# Patient Record
Sex: FEMALE | URBAN - METROPOLITAN AREA
[De-identification: names, ages, dates, MRNs, and addresses within clinical notes are randomized per-mention and may not be internally consistent; named-entity substitution may affect disease eponyms.]

---

## 2017-12-11 ENCOUNTER — OFFICE VISIT (OUTPATIENT)
Dept: PEDIATRIC ENDOCRINOLOGY | Facility: MEDICAL CENTER | Age: 22
End: 2017-12-11
Payer: COMMERCIAL

## 2017-12-11 ENCOUNTER — TELEPHONE (OUTPATIENT)
Dept: PEDIATRIC ENDOCRINOLOGY | Facility: MEDICAL CENTER | Age: 22
End: 2017-12-11

## 2017-12-11 VITALS
SYSTOLIC BLOOD PRESSURE: 110 MMHG | DIASTOLIC BLOOD PRESSURE: 80 MMHG | BODY MASS INDEX: 18.93 KG/M2 | WEIGHT: 90.2 LBS | HEART RATE: 72 BPM | HEIGHT: 58 IN

## 2017-12-11 DIAGNOSIS — E28.39 ACQUIRED PREMATURE OVARIAN FAILURE: ICD-10-CM

## 2017-12-11 DIAGNOSIS — L65.9 HAIR LOSS: ICD-10-CM

## 2017-12-11 DIAGNOSIS — R73.09 ELEVATED HEMOGLOBIN A1C: ICD-10-CM

## 2017-12-11 LAB
HBA1C MFR BLD: 5.4 % (ref ?–5.8)
INT CON NEG: NEGATIVE
INT CON POS: POSITIVE

## 2017-12-11 PROCEDURE — 99214 OFFICE O/P EST MOD 30 MIN: CPT | Performed by: NURSE PRACTITIONER

## 2017-12-11 PROCEDURE — 83036 HEMOGLOBIN GLYCOSYLATED A1C: CPT | Performed by: NURSE PRACTITIONER

## 2017-12-11 RX ORDER — LANCETS 33 GAUGE
200 EACH MISCELLANEOUS 3 TIMES DAILY
Qty: 100 EACH | Refills: 11 | Status: SHIPPED | OUTPATIENT
Start: 2017-12-11

## 2017-12-11 NOTE — PROGRESS NOTES
Subjective:     HPI:     Carole Waddell is a 22 y.o. 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    Carole Has not been seen in the past 2 and half years. She was last seen by Dr. Solano in June 2015. 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    In 2011, she was noted to have elevated blood sugars on routine labs. Repeat labs done in 2012 showed an A1c = 6.1% with negative insulin antibodies, JOVANY 65 and islet cell antibodies. Her A1c's remained at approximately 5.8% to 5.9% throughout 5435-2233.    She also has a history of elevated FSH and LH. She did have regular menses until April 2014. Subsequently, her periods stopped and she was started on oral contraceptives.    She reports that she discontinued her birth control pills approximately 2 months ago due to hair loss.  She states she had menses approximately one month ago which was described as spotting and lasting one week. Of note, her and her mother are not very good historians. Mom states prior to this she was on OCPs good compliance and regular menses.  Ovarian autoantibodies have been ordered in the past but not obtained.    Mom is concerned because she feels she has lost weight. She is lost 0.3 kg in the last 2 and half years. For breakfast she will have cereal, lunch with Isabel with milk, and dinner yesterday was Rice soup with chicken and veggies. She does not tend to snack in between meals. Her exercise consists of walking her dog. As you can see from her results below, her A1c has trended down to 5.4%. TANIA testing has been ordered in the past but not obtained.    She denies constipation, diarrhea, dry her skin, headaches. She is currently living at home with her parents and attending [employer] college.    Results for CAROLE WADDELL (MRN [id number])     "12/11/2017 08:17   Glycohemoglobin 5.4       ROS   No fatigue, loss of appetite.  No headaches.  No abdominal pain, nausea, vomiting, constipation or diarrhea.   + dry skin and hair loss.  No nocturia, polyuria, polydipsia  No sleep disturbance    No Known Allergies    Current medicines (including changes today)  Current Outpatient Prescriptions   Medication Sig Dispense Refill   • ONETOUCH DELICA LANCETS 33G Misc 200 Devices by Does not apply route 3 times a day. 100 Each 11   • glucose blood (ACCU-CHEK SMARTVIEW) strip Test blood sugars up to 3 x per day 100 Strip 11     No current facility-administered medications for this visit.        Patient Active Problem List    Diagnosis Date Noted   • Acquired premature ovarian failure 12/11/2017   • Elevated hemoglobin A1c 12/11/2017   • Hair loss 12/11/2017       Past Medical History: Short stature status post treatment with growth hormone (4199-0693). SGA (birth weight 2 lbs. 1 oz. at 32 weeks). Probable autoimmune ovarian failure with rising FSH and LH in 2014, OCPs started August 2014. History of elevated hemoglobin A1c's, rule out TANIA    Family History: Pertinent negatives: No clotting disorders. Pertinent positives: Thyroid cancer, lymphoma, esophageal cancer. Father: Hyperparathyroidism.    Social History:    Surgical History: None     Blood pressure 110/80, pulse 72, height 1.477 m (4' 10.16\"), weight 40.9 kg (90 lb 3.2 oz).    Physical Exam:  Constitutional: Thin, short stature No distress.   Skin: Skin is warm and dry. No rash noted.  Head: Atraumatic without lesions. Diffuse hair loss.  Mouth/Throat: Tongue normal. Oropharynx is clear and moist. Posterior pharynx without erythema or exudates.  Neck: Supple, trachea midline. No thyromegaly present.   Cardiovascular: Regular rate and rhythm.   Chest: Effort normal. Clear to auscultation throughout. No adventitious sounds.   Abdomen: Soft, non tender, and without distention.   Extremities: No cyanosis, clubbing, " erythema, nor edema.   Neurological: Alert and oriented x 3.Sensation intact.   Psychiatric:  Behavior, mood, and affect are appropriate.      Assessment and Plan:   The following treatment plan was discussed:     1. Acquired premature ovarian failure  In the distant past, she had elevated FSH and LH implying premature ovarian failure. Ovarian autoantibodies have been ordered but not obtained. I've given mom a lab slip at today's visit and asked her to obtain labs. Additionally, since she has been off OCPs, will obtain FSH and LH. It is curious that she had reported menses after stopping OCPs. The family is aware that if this is true autoimmune ovarian failure that she will be infertile. In the distant past, she was referred to reproductive endocrinology but did not follow-up. Given her history of premature ovarian failure, I would like to obtain a baseline DEXA scan to assess her bone density as well. The mother is aware that if this is still consistent with premature ovarian failure that she will require OCPs to prevent the long-term complications such as art disease, bone loss and osteoporosis, etc.  - CBC w Differential; Future  - Comprehensive Metabolic Panel; Future  - Lipid Profile; Future  - Vitamin D, 25 Hydroxy; Future  - Luteinizing Hormone; Future  - FSH; Future  - T4 Free; Future  - TSH; Future  - IGA Quant; Future  - T-Transglutaminase IGA; Future  - MISCELLANEOUS TEST; Future  - DS-BONE DENSITY STUDY (DEXA); Future    2. Elevated hemoglobin A1c  At today's visit, her A1c has trended down. However, she has a prolonged history of elevated A1c's. I will attempt to get a prior authorization to test for TANIA.    - POCT Hemoglobin A1C  - ONETOUCH DELICA LANCETS 33G Misc; 200 Devices by Does not apply route 3 times a day.  Dispense: 100 Each; Refill: 11  - glucose blood (ACCU-CHEK SMARTVIEW) strip; Test blood sugars up to 3 x per day  Dispense: 100 Strip; Refill: 11    3. Hair loss  She does have diffuse  hair loss. We'll screen for other autoimmune diseases that can cause hair loss such as celiac and thyroid disease. Additionally, since she is on vitamin D replacement, will obtain a vitamin D level as well.    -Any change or worsening of signs or symptoms, patient encouraged to follow-up or report to emergency room for further evaluation. Patient verbalizes understanding and agrees.    Followup: Return in about 3 months (around 3/11/2018).

## 2017-12-11 NOTE — TELEPHONE ENCOUNTER
Could you please get a prior authorization for TANIA (mature onset diabetes of the young) testing? It is already ordered under miscellaneous.

## 2017-12-22 LAB
25(OH)D3+25(OH)D2 SERPL-MCNC: 29.1 NG/ML (ref 30–100)
ALBUMIN SERPL-MCNC: 5 G/DL (ref 3.5–5.5)
ALBUMIN/GLOB SERPL: 1.9 {RATIO} (ref 1.2–2.2)
ALP SERPL-CCNC: 86 IU/L (ref 39–117)
ALT SERPL-CCNC: 29 IU/L (ref 0–32)
AST SERPL-CCNC: 35 IU/L (ref 0–40)
BASOPHILS # BLD AUTO: 0 X10E3/UL (ref 0–0.2)
BASOPHILS NFR BLD AUTO: 0 %
BILIRUB SERPL-MCNC: 0.4 MG/DL (ref 0–1.2)
BUN SERPL-MCNC: 11 MG/DL (ref 6–20)
BUN/CREAT SERPL: 13 (ref 9–23)
CALCIUM SERPL-MCNC: 9.7 MG/DL (ref 8.7–10.2)
CHLORIDE SERPL-SCNC: 100 MMOL/L (ref 96–106)
CHOLEST SERPL-MCNC: 185 MG/DL (ref 100–199)
CO2 SERPL-SCNC: 23 MMOL/L (ref 18–29)
COMMENT 011824: NORMAL
CREAT SERPL-MCNC: 0.83 MG/DL (ref 0.57–1)
EOSINOPHIL # BLD AUTO: 0.3 X10E3/UL (ref 0–0.4)
EOSINOPHIL NFR BLD AUTO: 7 %
ERYTHROCYTE [DISTWIDTH] IN BLOOD BY AUTOMATED COUNT: 14.9 % (ref 12.3–15.4)
FSH SERPL-ACNC: 32.1 MIU/ML
GLOBULIN SER CALC-MCNC: 2.7 G/DL (ref 1.5–4.5)
GLUCOSE SERPL-MCNC: 82 MG/DL (ref 65–99)
HCT VFR BLD AUTO: 45.4 % (ref 34–46.6)
HDLC SERPL-MCNC: 75 MG/DL
HGB BLD-MCNC: 14.5 G/DL (ref 11.1–15.9)
IF AFRICAN AMERICAN  100797: 116 ML/MIN/1.73
IF NON AFRICAN AMER 100791: 100 ML/MIN/1.73
IGA SERPL-MCNC: 170 MG/DL (ref 87–352)
IMM GRANULOCYTES # BLD: 0 X10E3/UL (ref 0–0.1)
IMM GRANULOCYTES NFR BLD: 0 %
IMMATURE CELLS  115398: NORMAL
LDLC SERPL CALC-MCNC: 99 MG/DL (ref 0–99)
LH SERPL-ACNC: 13.1 MIU/ML
LYMPHOCYTES # BLD AUTO: 1.6 X10E3/UL (ref 0.7–3.1)
LYMPHOCYTES NFR BLD AUTO: 35 %
Lab: NORMAL TITER
MCH RBC QN AUTO: 28.5 PG (ref 26.6–33)
MCHC RBC AUTO-ENTMCNC: 31.9 G/DL (ref 31.5–35.7)
MCV RBC AUTO: 89 FL (ref 79–97)
MONOCYTES # BLD AUTO: 0.3 X10E3/UL (ref 0.1–0.9)
MONOCYTES NFR BLD AUTO: 7 %
MORPHOLOGY BLD-IMP: NORMAL
NEUTROPHILS # BLD AUTO: 2.4 X10E3/UL (ref 1.4–7)
NEUTROPHILS NFR BLD AUTO: 51 %
NRBC BLD AUTO-RTO: NORMAL %
PLATELET # BLD AUTO: 209 X10E3/UL (ref 150–379)
POTASSIUM SERPL-SCNC: 4.2 MMOL/L (ref 3.5–5.2)
PROT SERPL-MCNC: 7.7 G/DL (ref 6–8.5)
RBC # BLD AUTO: 5.09 X10E6/UL (ref 3.77–5.28)
SODIUM SERPL-SCNC: 140 MMOL/L (ref 134–144)
T4 FREE SERPL-MCNC: 1.65 NG/DL (ref 0.82–1.77)
TRIGL SERPL-MCNC: 55 MG/DL (ref 0–149)
TSH SERPL DL<=0.005 MIU/L-ACNC: 3.27 UIU/ML (ref 0.45–4.5)
TTG IGA SER-ACNC: <2 U/ML (ref 0–3)
VLDLC SERPL CALC-MCNC: 11 MG/DL (ref 5–40)
WBC # BLD AUTO: 4.6 X10E3/UL (ref 3.4–10.8)

## 2018-01-17 ENCOUNTER — TELEPHONE (OUTPATIENT)
Dept: PEDIATRIC ENDOCRINOLOGY | Facility: MEDICAL CENTER | Age: 23
End: 2018-01-17

## 2018-01-17 NOTE — TELEPHONE ENCOUNTER
----- Message from Beatrice Cardenas Med Ass't sent at 1/17/2018  8:42 AM PST -----      ----- Message -----  From: JENNIFER Carlin  Sent: 1/16/2018   1:12 PM  To: Beatrice Cardenas, Med Ass't    Please call parents with results. Her bone density is low. I would like to discuss with the patient starting medication (Lo Loestrin) to improve her bone density. Could you please call her and have her make an appointment.

## 2018-03-12 ENCOUNTER — APPOINTMENT (OUTPATIENT)
Dept: PEDIATRIC ENDOCRINOLOGY | Facility: MEDICAL CENTER | Age: 23
End: 2018-03-12
Payer: COMMERCIAL

## 2018-10-25 ENCOUNTER — OFFICE VISIT (OUTPATIENT)
Dept: PEDIATRIC ENDOCRINOLOGY | Facility: MEDICAL CENTER | Age: 23
End: 2018-10-25
Payer: COMMERCIAL

## 2018-10-25 VITALS
DIASTOLIC BLOOD PRESSURE: 58 MMHG | HEART RATE: 80 BPM | WEIGHT: 87.2 LBS | BODY MASS INDEX: 17.58 KG/M2 | HEIGHT: 59 IN | SYSTOLIC BLOOD PRESSURE: 92 MMHG

## 2018-10-25 DIAGNOSIS — M85.89 OSTEOPENIA OF MULTIPLE SITES: ICD-10-CM

## 2018-10-25 DIAGNOSIS — E28.39 OVARIAN FAILURE: ICD-10-CM

## 2018-10-25 DIAGNOSIS — R73.09 ELEVATED HEMOGLOBIN A1C: ICD-10-CM

## 2018-10-25 LAB
HBA1C MFR BLD: 5.8 % (ref ?–5.8)
INT CON NEG: NEGATIVE
INT CON POS: POSITIVE

## 2018-10-25 PROCEDURE — 99214 OFFICE O/P EST MOD 30 MIN: CPT | Performed by: NURSE PRACTITIONER

## 2018-10-25 PROCEDURE — 83036 HEMOGLOBIN GLYCOSYLATED A1C: CPT | Performed by: NURSE PRACTITIONER

## 2018-10-25 RX ORDER — NORETHINDRONE ACETATE AND ETHINYL ESTRADIOL 1MG-20(21)
1 KIT ORAL DAILY
Qty: 28 TAB | Refills: 5 | Status: SHIPPED | OUTPATIENT
Start: 2018-10-25 | End: 2019-01-18 | Stop reason: SDUPTHER

## 2018-10-25 RX ORDER — BLOOD-GLUCOSE METER
KIT MISCELLANEOUS
Qty: 1 DEVICE | Refills: 11 | Status: SHIPPED | OUTPATIENT
Start: 2018-10-25 | End: 2019-01-18 | Stop reason: SDUPTHER

## 2018-10-25 NOTE — PROGRESS NOTES
Subjective:     HPI:     Carole Waddell is a 23 y.o. 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     Carole had not been seen until just recently.  She was last seen by Dr. Solano in June 2015. 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     In 2011, she was noted to have elevated blood sugars on routine labs. Repeat labs done in 2012 showed an A1c = 6.1% with negative insulin antibodies, JOVANY 65 and islet cell antibodies. Her A1c's remained at approximately 5.8% to 5.9% throughout 7340-7609.  I did attempt to get prior authorization for Daryl testing at her last visit.  However, I did not hear back from the insurance company.  I did have the medical assistant at today's visit we submit a prior authorization.     She also has a history of elevated FSH and LH. She did have regular menses until April 2014. Subsequently, her periods stopped and she was started on oral contraceptives.  She discontinued her oral contraceptives in approximately October 2017 due to hair loss.  Mom states that she then started her on a women's multivitamin which resulted in regular periods for the next 4-6 months.  However, she then began skipping her cycle.  At this point (last month), mom resumed her OCPs and she resumed having cycles.  I did obtain a DEXA scan that was done at [facility] due to her history of ovarian failure that shows osteopenia of her AP spine, dual femurs right and left.  Repeat lab testing (see below) shows continued elevation of FSH and LH.  Interestingly, her anti-ovarian antibody testing was negative.      Mom is concerned because she feels she has lost weight.  At the last visit she had only loss 0.3 kg in the last 2 and half years.  Mom remains concerned about her weight.     She is currently  living at home and attending [employer].  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    Mom reports that in addition to a women's multivitamin, she is also on vitamin D, 2000 units daily.  They report good compliance.     12/19/2017 07:46 10/25/2018 10:41   WBC 4.6    RBC 5.09    Hemoglobin 14.5    Hematocrit 45.4    MCV 89    MCH 28.5    MCHC 31.9    RDW 14.9    Platelet Count 209    Immature Cells CANCELED    Neutrophils-Polys 51    Neutrophils (Absolute) 2.4    Lymphocytes 35    Lymphs (Absolute) 1.6    Monocytes 7    Monos (Absolute) 0.3    Eosinophils 7    Eos (Absolute) 0.3    Basophils 0    Baso (Absolute) 0.0    Immature Granulocytes 0    Immature Granulocytes (abs) 0.0    Nucleated RBC CANCELED    Comments-Diff CANCELED    Sodium 140    Potassium 4.2    Chloride 100    Co2 23    Glucose 82    Bun 11    Creatinine 0.83    GFR If  116    GFR If Non African American 100    Bun-Creatinine Ratio 13    Calcium 9.7    AST(SGOT) 35    ALT(SGPT) 29    Alkaline Phosphatase 86    Total Bilirubin 0.4    Albumin 5.0    Total Protein 7.7    Globulin 2.7    A-G Ratio 1.9    Glycohemoglobin  5.8   Cholesterol,Tot 185    Triglycerides 55    HDL 75    LDL 99    VLDL Cholesterol Calc 11    Comment: CANCELED    25-Hydroxy   Vitamin D 25 29.1 (L)    Anti-Ovary Antibody <1:10    Immunoglobulin A 170    t-TG IgA <2    TSH 3.270    Free T-4 1.65    Follicle Stimulating Hormone 32.1    Luteinizing Hormone 13.1        ROS   No fatigue, loss of appetite.  No headaches.  No abdominal pain, nausea, vomiting, constipation or diarrhea.   No dry skin, dry hair or hair loss.  No nocturia, polyuria, polydipsia  No sleep disturbance  No fracture    No Known Allergies    Current medicines (including changes today)  Current Outpatient Prescriptions   Medication Sig Dispense Refill   • Blood Glucose Monitoring Suppl (FREESTYLE LITE) Device Used to test blood sugars up to 3 times per day 1 Device 11   • glucose blood (FREESTYLE LITE)  "strip Test blood sugars up to 3 x per day 100 Strip 11   • norethindrone-ethinyl estradiol (LOESTRIN FE 1/20) 1-20 MG-MCG per tablet Take 1 Tab by mouth every day. 28 Tab 5   • ONETOUCH DELICA LANCETS 33G Misc 200 Devices by Does not apply route 3 times a day. 100 Each 11     No current facility-administered medications for this visit.        Patient Active Problem List    Diagnosis Date Noted   • Osteopenia of multiple sites 10/25/2018   • Acquired premature ovarian failure 12/11/2017   • Elevated hemoglobin A1c 12/11/2017   • Hair loss 12/11/2017       Past Medical History: Short stature status post treatment with growth hormone (0731-3150). SGA (birth weight 2 lbs. 1 oz. at 32 weeks). Probable autoimmune ovarian failure with rising FSH and LH in 2014, OCPs started August 2014. History of elevated hemoglobin A1c's, rule out TANIA     Family History: Pertinent negatives: No clotting disorders. Pertinent positives: Thyroid cancer, lymphoma, esophageal cancer. Father: Hyperparathyroidism.     Social History:     Surgical History: None      Objective:     Blood pressure (!) 92/58, pulse 80, height 1.487 m (4' 10.54\"), weight 39.6 kg (87 lb 3.2 oz).    Physical Exam:  Constitutional: Well-developed and well-nourished.  No distress.   Skin: Skin is warm and dry. No rash noted.  Head: Atraumatic without lesions.  Eyes:  No scleral icterus.   Mouth/Throat: Tongue normal. Oropharynx is clear and moist. Posterior pharynx without erythema or exudates.  Neck: Supple, trachea midline. No thyromegaly present.   Cardiovascular: Regular rate and rhythm.   Chest: Effort normal. Clear to auscultation throughout. No adventitious sounds.   Abdomen: Soft, non tender, and without distention. Active bowel sounds in all four quadrants. No rebound, guarding, masses or hepatosplenomegaly.  Extremities: No cyanosis, clubbing, erythema, nor edema.   Neurological: Alert and oriented x 3.Sensation intact.   Psychiatric:  Behavior, mood, and " affect are appropriate.      Assessment and Plan:   The following treatment plan was discussed:     1. Elevated hemoglobin A1c  She continues to have mildly elevated A1c's.  Mom is requesting a new glucometer and test strips.  One was sent to the pharmacy that is covered under their plan.  I have asked my medical assistant to resubmit authorization for MOD Y testing.  This was done at the time of the visit.  She was instructed to call the office if she develops polyuria, polydipsia or rising blood sugars.  - POCT Hemoglobin A1C    2. Ovarian failure  The etiology of her ovarian failure is not clear.  She has negative ovarian antibodies.  In the past this was thought to be autoimmune in nature.  Given her advancing age, I will refer to gynecology.  I am pleased that she is restart her birth control pills, these will provide needed estrogen to prevent further bone loss.  Mom is requesting a refill.  She states she would like to start the Loestrin that Dr. Solano started on in the past.  She felt she did best on this OCP.  Today we did discuss the possibility of impaired fertility due to her ovarian failure.  I have recommended that she talk with a reproductive endocrinology about options for egg preservation.  It was explained to the patient and her mother that it will likely be difficult for her to become pregnant in the future.  Therefore, I would like a reproductive endocrinologist to determine if he thinks a retrieval is an option.  I have told him not to wait as time could be of the essence.  - US-PELVIC TRANSABDOMINAL ONLY; Future  - REFERRAL TO GYNECOLOGY  - REFERRAL TO ENDOCRINOLOGY  - Blood Glucose Monitoring Suppl (FREESTYLE LITE) Device; Used to test blood sugars up to 3 times per day  Dispense: 1 Device; Refill: 11  - glucose blood (FREESTYLE LITE) strip; Test blood sugars up to 3 x per day  Dispense: 100 Strip; Refill: 11  - norethindrone-ethinyl estradiol (LOESTRIN FE 1/20) 1-20 MG-MCG per tablet; Take 1  Tab by mouth every day.  Dispense: 28 Tab; Refill: 5    3. Osteopenia of multiple sites  DEXA scan revealed osteopenia consistent with her ovarian failure.  I am pleased she is back on birth control pills.  No history of fractures.  She is currently on vitamin D and a multivitamin with calcium.  We also discussed the importance of weightbearing exercise to maximize bone potential.  - REFERRAL TO ENDOCRINOLOGY  - Blood Glucose Monitoring Suppl (FREESTYLE LITE) Device; Used to test blood sugars up to 3 times per day  Dispense: 1 Device; Refill: 11  - glucose blood (FREESTYLE LITE) strip; Test blood sugars up to 3 x per day  Dispense: 100 Strip; Refill: 11  - norethindrone-ethinyl estradiol (LOESTRIN FE 1/20) 1-20 MG-MCG per tablet; Take 1 Tab by mouth every day.  Dispense: 28 Tab; Refill: 5    -Any change or worsening of signs or symptoms, patient encouraged to follow-up or report to emergency room for further evaluation. Patient verbalizes understanding and agrees.    Followup: Return for referred to gyn.

## 2018-10-30 ENCOUNTER — TELEPHONE (OUTPATIENT)
Dept: PEDIATRIC ENDOCRINOLOGY | Facility: MEDICAL CENTER | Age: 23
End: 2018-10-30

## 2018-10-30 DIAGNOSIS — R73.09 ELEVATED HEMOGLOBIN A1C: ICD-10-CM

## 2018-10-30 DIAGNOSIS — R73.9 HYPERGLYCEMIA: ICD-10-CM

## 2018-10-30 RX ORDER — BLOOD-GLUCOSE METER
1 EACH MISCELLANEOUS
Qty: 1 KIT | Refills: 11 | Status: SHIPPED | OUTPATIENT
Start: 2018-10-30 | End: 2019-11-19

## 2018-10-30 NOTE — TELEPHONE ENCOUNTER
Mom called requesting to have accu-check meter ans trips sent to [facility] mom states her insurance will cover .

## 2019-01-17 ENCOUNTER — TELEPHONE (OUTPATIENT)
Dept: PEDIATRIC ENDOCRINOLOGY | Facility: MEDICAL CENTER | Age: 24
End: 2019-01-17

## 2019-01-17 DIAGNOSIS — E28.39 OVARIAN FAILURE: ICD-10-CM

## 2019-01-17 DIAGNOSIS — R73.9 HYPERGLYCEMIA: ICD-10-CM

## 2019-01-17 DIAGNOSIS — R73.09 ELEVATED HEMOGLOBIN A1C: ICD-10-CM

## 2019-01-17 DIAGNOSIS — M85.89 OSTEOPENIA OF MULTIPLE SITES: ICD-10-CM

## 2019-01-17 NOTE — TELEPHONE ENCOUNTER
Mother came in asking to change pharmacy to [facility] at [address], [place] [address]. Phone number [phone].   Please send rx for Kwan Fe birth control and freestyle monitor and test strip since ins does not cover accu-chek starting January.    See media for med change notice.

## 2019-01-18 RX ORDER — NORETHINDRONE ACETATE AND ETHINYL ESTRADIOL 1MG-20(21)
1 KIT ORAL DAILY
Qty: 28 TAB | Refills: 5 | Status: SHIPPED | OUTPATIENT
Start: 2019-01-18 | End: 2019-01-21 | Stop reason: SDUPTHER

## 2019-01-18 RX ORDER — BLOOD-GLUCOSE METER
KIT MISCELLANEOUS
Qty: 1 DEVICE | Refills: 11 | Status: SHIPPED | OUTPATIENT
Start: 2019-01-18 | End: 2019-01-21 | Stop reason: SDUPTHER

## 2019-01-21 ENCOUNTER — TELEPHONE (OUTPATIENT)
Dept: PEDIATRIC ENDOCRINOLOGY | Facility: MEDICAL CENTER | Age: 24
End: 2019-01-21

## 2019-01-21 RX ORDER — NORETHINDRONE ACETATE AND ETHINYL ESTRADIOL 1MG-20(21)
1 KIT ORAL DAILY
Qty: 28 TAB | Refills: 5 | Status: SHIPPED | OUTPATIENT
Start: 2019-01-21 | End: 2019-11-19 | Stop reason: SDUPTHER

## 2019-01-21 RX ORDER — BLOOD-GLUCOSE METER
KIT MISCELLANEOUS
Qty: 1 DEVICE | Refills: 11 | Status: SHIPPED | OUTPATIENT
Start: 2019-01-21

## 2019-01-21 NOTE — TELEPHONE ENCOUNTER
Pharmacy contacted us regarding prescription for test strips. Should patient be checking 3x per day or 6-10x per day? Let me know.

## 2019-11-19 ENCOUNTER — OFFICE VISIT (OUTPATIENT)
Dept: PEDIATRIC ENDOCRINOLOGY | Facility: MEDICAL CENTER | Age: 24
End: 2019-11-19
Payer: COMMERCIAL

## 2019-11-19 VITALS
WEIGHT: 91 LBS | DIASTOLIC BLOOD PRESSURE: 64 MMHG | SYSTOLIC BLOOD PRESSURE: 102 MMHG | HEART RATE: 88 BPM | BODY MASS INDEX: 19.1 KG/M2 | HEIGHT: 58 IN

## 2019-11-19 DIAGNOSIS — R73.09 ELEVATED HEMOGLOBIN A1C: ICD-10-CM

## 2019-11-19 DIAGNOSIS — M85.89 OSTEOPENIA OF MULTIPLE SITES: ICD-10-CM

## 2019-11-19 DIAGNOSIS — Z86.39 HISTORY OF HYPERGLYCEMIA: ICD-10-CM

## 2019-11-19 DIAGNOSIS — E28.39 OVARIAN FAILURE: ICD-10-CM

## 2019-11-19 PROBLEM — L65.9 HAIR LOSS: Status: RESOLVED | Noted: 2017-12-11 | Resolved: 2019-11-19

## 2019-11-19 LAB
HBA1C MFR BLD: 5.5 % (ref 0–5.6)
INT CON NEG: NEGATIVE
INT CON POS: POSITIVE

## 2019-11-19 PROCEDURE — 99214 OFFICE O/P EST MOD 30 MIN: CPT | Performed by: NURSE PRACTITIONER

## 2019-11-19 PROCEDURE — 83036 HEMOGLOBIN GLYCOSYLATED A1C: CPT | Performed by: NURSE PRACTITIONER

## 2019-11-19 RX ORDER — NORETHINDRONE ACETATE AND ETHINYL ESTRADIOL 1MG-20(21)
1 KIT ORAL DAILY
Qty: 28 TAB | Refills: 6 | Status: SHIPPED | OUTPATIENT
Start: 2019-11-19 | End: 2020-10-07

## 2019-11-19 ASSESSMENT — PATIENT HEALTH QUESTIONNAIRE - PHQ9: CLINICAL INTERPRETATION OF PHQ2 SCORE: 0

## 2019-11-19 NOTE — PROGRESS NOTES
Subjective:     HPI:     Carole Waddell is a 24 y.o. 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      New since last visit: Approximately 1 year ago, the family was referred to gynecology to follow-up with her ovarian failure.  However, mom was referred to [facility] gynecologist and thought her insurance was not compatible with [facility].  Therefore, the family did not establish with gyn.  They are here for a routine visit.       Carole had not been seen until just recently.  She was last seen by Dr. Solano in June 2015. 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     In 2011, she was noted to have elevated blood sugars on routine labs. Repeat labs done in 2012 showed an A1c = 6.1% with negative insulin antibodies, JOVANY 65 and islet cell antibodies. Her A1c's remained at approximately 5.8% to 5.9% throughout 8930-9005.  I did attempt to get prior authorization for TANIA testing at her last visit.  However, I did not hear back from the insurance company. At this point, her A1C has been in a normal range for over 2 years, will hold on TANIA testing.       She also has a history of elevated FSH and LH. She did have regular menses until April 2014. Subsequently, her periods stopped and she was started on oral contraceptives.  She discontinued her oral contraceptives in approximately October 2017 due to hair loss.  Mom states that she then started her on a women's multivitamin which resulted in regular periods for the next 4-6 months.  However, she then began skipping her cycle.  At this point (September of 2018), mom resumed her OCPs and she resumed having cycles.  I did obtain a DEXA scan that was done at [facility]'s due to her history of ovarian failure that shows osteopenia of her AP  spine, dual femurs right and left.  Repeat lab testing done in 2017 (see below) shows continued elevation of FSH and LH.  Interestingly, her anti-ovarian antibody testing was negative.       She is currently living at home and attending [employer].  She is a [employer] major.    She eats healthy, protein fruits and vegetables at all meals.  Her sleep is good, she goes to be around 12am and wakes at 7am.  She is in school 2 days per week.  Body surface area is 1.3 meters squared.  Mom is concerned that she is too thin.  It was explained to the patient and the mother that her BMI is on the lower side of normal.  Overall, she eats a healthy diet rich in fruits, vegetables and lean meats.  They rarely eat fast food she does not drink sugary drinks.    She remains on Lo Loestrin.  She is having monthly cycles on this regimen.     Mom reports that in addition to a women's multivitamin, she is also on vitamin D, 2000 units daily.  They report good compliance.  Mom feels the vitamins help her periods cycle before restarting OCPs and also helped with her hair growth.         12/19/2017 07:46   WBC 4.6   RBC 5.09   Hemoglobin 14.5   Hematocrit 45.4   MCV 89   MCH 28.5   MCHC 31.9   RDW 14.9   Platelet Count 209   Immature Cells CANCELED   Neutrophils-Polys 51   Neutrophils (Absolute) 2.4   Lymphocytes 35   Lymphs (Absolute) 1.6   Monocytes 7   Monos (Absolute) 0.3   Eosinophils 7   Eos (Absolute) 0.3   Basophils 0   Baso (Absolute) 0.0   Immature Granulocytes 0   Immature Granulocytes (abs) 0.0   Nucleated RBC CANCELED   Comments-Diff CANCELED   Sodium 140   Potassium 4.2   Chloride 100   Co2 23   Glucose 82   Bun 11   Creatinine 0.83   GFR If  116   GFR If Non African American 100   Bun-Creatinine Ratio 13   Calcium 9.7   AST(SGOT) 35   ALT(SGPT) 29   Alkaline Phosphatase 86   Total Bilirubin 0.4   Albumin 5.0   Total Protein 7.7   Globulin 2.7   A-G Ratio 1.9   Glycohemoglobin     Cholesterol,Tot 185    Triglycerides 55   HDL 75   LDL 99   VLDL Cholesterol Calc 11   Comment: CANCELED   25-Hydroxy   Vitamin D 25 29.1 (L)   Anti-Ovary Antibody <1:10   Immunoglobulin A 170   t-TG IgA <2   TSH 3.270   Free T-4 1.65   Follicle Stimulating Hormone 32.1   Luteinizing Hormone 13.1          Review of: meter was not done.  She did not bring a meter to clinic today.  She reports normal blood sugars in the  range..    ROS   No fatigue, loss of appetite.  No headaches.  No abdominal pain, nausea, vomiting, constipation or diarrhea.    + changes in vision, wears glasses.   No easy bruising  No dry skin, dry hair or hair loss.  No nocturia, polyuria, polydipsia  No sleep disturbance    No Known Allergies    Current medicines (including changes today)  Current Outpatient Medications   Medication Sig Dispense Refill   • norethindrone-ethinyl estradiol (LOESTRIN FE 1/20) 1-20 MG-MCG per tablet Take 1 Tab by mouth every day. 28 Tab 6   • glucose blood (FREESTYLE LITE) strip Check blood sugars 6 x per day. 200 Strip 11   • Blood Glucose Monitoring Suppl (FREESTYLE LITE) Device Used to test blood sugars up to 3 times per day 1 Device 11   • ONETOUCH DELICA LANCETS 33G Misc 200 Devices by Does not apply route 3 times a day. 100 Each 11     No current facility-administered medications for this visit.        Patient Active Problem List    Diagnosis Date Noted   • Ovarian failure 11/19/2019   • Osteopenia of multiple sites 10/25/2018   • Acquired premature ovarian failure 12/11/2017       Past Medical History: Short stature status post treatment with growth hormone ([id number]). SGA (birth weight 2 lbs. 1 oz. at 32 weeks). Probable autoimmune ovarian failure with rising FSH and LH in 2014, OCPs started August 2014. History of elevated hemoglobin A1c's, rule out TANIA     Family History: Pertinent negatives: No clotting disorders. Pertinent positives: Thyroid cancer, lymphoma, esophageal cancer. Father: Hyperparathyroidism.     Social  "History:     Surgical History: None      Objective:     /64 (BP Location: Left arm, Patient Position: Sitting, BP Cuff Size: Small adult)   Pulse 88   Ht 1.483 m (4' 10.39\")   Wt 41.3 kg (91 lb)     Physical Exam:  Constitutional: Well-developed and well-nourished.  No distress.   Skin: Skin is warm and dry. No rash noted.  Head: Atraumatic without lesions.  Eyes:  Pupils are equal, round, and reactive to light. No scleral icterus.   Mouth/Throat: Tongue normal. Oropharynx is clear and moist. Posterior pharynx without erythema or exudates.  Neck: Supple, trachea midline. No thyromegaly present.   Cardiovascular: Regular rate and rhythm.   Chest: Effort normal. Clear to auscultation throughout. No adventitious sounds.   Abdomen: Soft, non tender, and without distention. Active bowel sounds in all four quadrants. No rebound, guarding, masses or hepatosplenomegaly.  Extremities: No cyanosis, clubbing, erythema, nor edema.   Neurological: Alert and oriented x 3.Sensation intact.   Psychiatric:  Behavior, mood, and affect are appropriate.      Assessment and Plan:   The following treatment plan was discussed:     1. Elevated hemoglobin A1c  Her A1c has normalized.  Daryl testing was attempted but unsuccessful due to the fact that her insurance company would not respond to repeated requests for prior authorizations for the testing.  She is asymptomatic without signs of elevated blood sugars and her hemoglobin A1c today in office remained within a normal range.  Therefore, I will forego additional testing.  - POCT Hemoglobin A1C  - glucose blood (FREESTYLE LITE) strip; Check blood sugars 6 x per day.  Dispense: 200 Strip; Refill: 11  - Comp Metabolic Panel; Future  - Lipid Profile; Future  - Vitamin D, 25 Hydroxy; Future  - T4 Free; Future  - TSH; Future    2. Ovarian failure  She has a history of ovarian failure with elevated LH and FSH.  Interestingly her anti-ovary antibodies were negative.  Will re-refer to " gynecology.  We will continue her on her oral contraceptive pills.  We will check a lipid panel as well.  - REFERRAL TO GYNECOLOGY  - norethindrone-ethinyl estradiol (LOESTRIN FE 1/20) 1-20 MG-MCG per tablet; Take 1 Tab by mouth every day.  Dispense: 28 Tab; Refill: 6  - Comp Metabolic Panel; Future  - Lipid Profile; Future  - Vitamin D, 25 Hydroxy; Future  - T4 Free; Future  - TSH; Future    3. Osteopenia of multiple sites  She remains on a multivitamin with calcium along with vitamin D.  I will repeat her calcium and vitamin D levels.  We also talked about the importance of weightbearing exercise.  I am pleased that she is back on estrogen in the form of OCPs.  - REFERRAL TO GYNECOLOGY  - norethindrone-ethinyl estradiol (LOESTRIN FE 1/20) 1-20 MG-MCG per tablet; Take 1 Tab by mouth every day.  Dispense: 28 Tab; Refill: 6    4. History of hyperglycemia  Resolved.  Continues to check blood sugars.  - glucose blood (FREESTYLE LITE) strip; Check blood sugars 6 x per day.  Dispense: 200 Strip; Refill: 11    PLEASE NOTE: This dictation was created using voice recognition software. I have made every reasonable attempt to correct obvious errors, but I expect that there are errors of grammar and possibly content that I did not discover before finalizing the note.    -Any change or worsening of signs or symptoms, patient encouraged to follow-up or report to emergency room for further evaluation. Patient verbalizes understanding and agrees.    Followup: Return f/u with gyn, for follow up with gynecology.  age d/c from practice.

## 2019-11-19 NOTE — PROGRESS NOTES
Depression Screening    Little interest or pleasure in doing things?  0 - not at all  Feeling down, depressed , or hopeless? 0 - not at all  Patient Health Questionnaire Score: 0    If depressive symptoms identified deferred to follow up visit unless specifically addressed in assesment and plan.      Interpretation of PHQ-9 Total Score   Score Severity   1-4 Minimal Depression   5-9 Mild Depression   10-14 Moderate Depression   15-19 Moderately Severe Depression   20-27 Severe Depression

## 2020-01-03 ENCOUNTER — HOSPITAL ENCOUNTER (OUTPATIENT)
Dept: LAB | Facility: MEDICAL CENTER | Age: 25
End: 2020-01-03
Attending: NURSE PRACTITIONER
Payer: COMMERCIAL

## 2020-01-03 DIAGNOSIS — E28.39 OVARIAN FAILURE: ICD-10-CM

## 2020-01-03 DIAGNOSIS — R73.09 ELEVATED HEMOGLOBIN A1C: ICD-10-CM

## 2020-01-03 LAB
ALBUMIN SERPL BCP-MCNC: 4.8 G/DL (ref 3.2–4.9)
ALBUMIN/GLOB SERPL: 1.4 G/DL
ALP SERPL-CCNC: 55 U/L (ref 30–99)
ALT SERPL-CCNC: 13 U/L (ref 2–50)
ANION GAP SERPL CALC-SCNC: 10 MMOL/L (ref 0–11.9)
AST SERPL-CCNC: 20 U/L (ref 12–45)
BILIRUB SERPL-MCNC: 0.5 MG/DL (ref 0.1–1.5)
BUN SERPL-MCNC: 14 MG/DL (ref 8–22)
CALCIUM SERPL-MCNC: 9.6 MG/DL (ref 8.5–10.5)
CHLORIDE SERPL-SCNC: 103 MMOL/L (ref 96–112)
CHOLEST SERPL-MCNC: 154 MG/DL (ref 100–199)
CO2 SERPL-SCNC: 25 MMOL/L (ref 20–33)
CREAT SERPL-MCNC: 0.89 MG/DL (ref 0.5–1.4)
FASTING STATUS PATIENT QL REPORTED: NORMAL
GLOBULIN SER CALC-MCNC: 3.4 G/DL (ref 1.9–3.5)
GLUCOSE SERPL-MCNC: 75 MG/DL (ref 65–99)
HDLC SERPL-MCNC: 66 MG/DL
LDLC SERPL CALC-MCNC: 76 MG/DL
POTASSIUM SERPL-SCNC: 4.3 MMOL/L (ref 3.6–5.5)
PROT SERPL-MCNC: 8.2 G/DL (ref 6–8.2)
SODIUM SERPL-SCNC: 138 MMOL/L (ref 135–145)
TRIGL SERPL-MCNC: 59 MG/DL (ref 0–149)

## 2020-01-03 PROCEDURE — 82306 VITAMIN D 25 HYDROXY: CPT

## 2020-01-03 PROCEDURE — 36415 COLL VENOUS BLD VENIPUNCTURE: CPT

## 2020-01-03 PROCEDURE — 84439 ASSAY OF FREE THYROXINE: CPT

## 2020-01-03 PROCEDURE — 80053 COMPREHEN METABOLIC PANEL: CPT

## 2020-01-03 PROCEDURE — 80061 LIPID PANEL: CPT

## 2020-01-03 PROCEDURE — 84443 ASSAY THYROID STIM HORMONE: CPT

## 2020-01-04 LAB
25(OH)D3 SERPL-MCNC: 38 NG/ML (ref 30–100)
T4 FREE SERPL-MCNC: 1.02 NG/DL (ref 0.53–1.43)
TSH SERPL DL<=0.005 MIU/L-ACNC: 1.21 UIU/ML (ref 0.38–5.33)

## 2024-12-06 ENCOUNTER — HOSPITAL ENCOUNTER (OUTPATIENT)
Dept: LAB | Facility: MEDICAL CENTER | Age: 29
End: 2024-12-06
Attending: PHYSICIAN ASSISTANT
Payer: COMMERCIAL

## 2024-12-06 LAB
ALBUMIN SERPL BCP-MCNC: 4.9 G/DL (ref 3.2–4.9)
ALBUMIN/GLOB SERPL: 1.7 G/DL
ALP SERPL-CCNC: 82 U/L (ref 30–99)
ALT SERPL-CCNC: 14 U/L (ref 2–50)
ANION GAP SERPL CALC-SCNC: 10 MMOL/L (ref 7–16)
AST SERPL-CCNC: 27 U/L (ref 12–45)
BASOPHILS # BLD AUTO: 0.3 % (ref 0–1.8)
BASOPHILS # BLD: 0.02 K/UL (ref 0–0.12)
BILIRUB SERPL-MCNC: 0.5 MG/DL (ref 0.1–1.5)
BUN SERPL-MCNC: 18 MG/DL (ref 8–22)
CALCIUM ALBUM COR SERPL-MCNC: 8.8 MG/DL (ref 8.5–10.5)
CALCIUM SERPL-MCNC: 9.5 MG/DL (ref 8.5–10.5)
CHLORIDE SERPL-SCNC: 101 MMOL/L (ref 96–112)
CHOLEST SERPL-MCNC: 191 MG/DL (ref 100–199)
CO2 SERPL-SCNC: 25 MMOL/L (ref 20–33)
CREAT SERPL-MCNC: 0.66 MG/DL (ref 0.5–1.4)
EOSINOPHIL # BLD AUTO: 0.14 K/UL (ref 0–0.51)
EOSINOPHIL NFR BLD: 2.1 % (ref 0–6.9)
ERYTHROCYTE [DISTWIDTH] IN BLOOD BY AUTOMATED COUNT: 43.7 FL (ref 35.9–50)
FASTING STATUS PATIENT QL REPORTED: NORMAL
GFR SERPLBLD CREATININE-BSD FMLA CKD-EPI: 122 ML/MIN/1.73 M 2
GLOBULIN SER CALC-MCNC: 2.9 G/DL (ref 1.9–3.5)
GLUCOSE SERPL-MCNC: 96 MG/DL (ref 65–99)
HCT VFR BLD AUTO: 47.7 % (ref 37–47)
HDLC SERPL-MCNC: 82 MG/DL
HGB BLD-MCNC: 15.3 G/DL (ref 12–16)
IMM GRANULOCYTES # BLD AUTO: 0.02 K/UL (ref 0–0.11)
IMM GRANULOCYTES NFR BLD AUTO: 0.3 % (ref 0–0.9)
LDLC SERPL CALC-MCNC: 102 MG/DL
LYMPHOCYTES # BLD AUTO: 1.35 K/UL (ref 1–4.8)
LYMPHOCYTES NFR BLD: 20 % (ref 22–41)
MCH RBC QN AUTO: 28.9 PG (ref 27–33)
MCHC RBC AUTO-ENTMCNC: 32.1 G/DL (ref 32.2–35.5)
MCV RBC AUTO: 90 FL (ref 81.4–97.8)
MONOCYTES # BLD AUTO: 0.35 K/UL (ref 0–0.85)
MONOCYTES NFR BLD AUTO: 5.2 % (ref 0–13.4)
NEUTROPHILS # BLD AUTO: 4.88 K/UL (ref 1.82–7.42)
NEUTROPHILS NFR BLD: 72.1 % (ref 44–72)
NRBC # BLD AUTO: 0 K/UL
NRBC BLD-RTO: 0 /100 WBC (ref 0–0.2)
PLATELET # BLD AUTO: 176 K/UL (ref 164–446)
PMV BLD AUTO: 11.7 FL (ref 9–12.9)
POTASSIUM SERPL-SCNC: 4.1 MMOL/L (ref 3.6–5.5)
PROT SERPL-MCNC: 7.8 G/DL (ref 6–8.2)
RBC # BLD AUTO: 5.3 M/UL (ref 4.2–5.4)
SODIUM SERPL-SCNC: 136 MMOL/L (ref 135–145)
TRIGL SERPL-MCNC: 34 MG/DL (ref 0–149)
WBC # BLD AUTO: 6.8 K/UL (ref 4.8–10.8)

## 2024-12-06 PROCEDURE — 80053 COMPREHEN METABOLIC PANEL: CPT

## 2024-12-06 PROCEDURE — 83036 HEMOGLOBIN GLYCOSYLATED A1C: CPT

## 2024-12-06 PROCEDURE — 85025 COMPLETE CBC W/AUTO DIFF WBC: CPT

## 2024-12-06 PROCEDURE — 80061 LIPID PANEL: CPT

## 2024-12-06 PROCEDURE — 36415 COLL VENOUS BLD VENIPUNCTURE: CPT

## 2024-12-07 LAB
EST. AVERAGE GLUCOSE BLD GHB EST-MCNC: 134 MG/DL
HBA1C MFR BLD: 6.3 % (ref 4–5.6)